# Patient Record
Sex: MALE | NOT HISPANIC OR LATINO | Employment: FULL TIME | URBAN - METROPOLITAN AREA
[De-identification: names, ages, dates, MRNs, and addresses within clinical notes are randomized per-mention and may not be internally consistent; named-entity substitution may affect disease eponyms.]

---

## 2019-08-06 ENCOUNTER — OFFICE VISIT (OUTPATIENT)
Dept: URGENT CARE | Facility: CLINIC | Age: 29
End: 2019-08-06

## 2019-08-06 VITALS
HEIGHT: 65 IN | HEART RATE: 116 BPM | TEMPERATURE: 98.3 F | OXYGEN SATURATION: 96 % | SYSTOLIC BLOOD PRESSURE: 116 MMHG | BODY MASS INDEX: 27.52 KG/M2 | RESPIRATION RATE: 12 BRPM | DIASTOLIC BLOOD PRESSURE: 72 MMHG | WEIGHT: 165.2 LBS

## 2019-08-06 DIAGNOSIS — L30.9 DERMATITIS: ICD-10-CM

## 2019-08-06 PROCEDURE — 99203 OFFICE O/P NEW LOW 30 MIN: CPT | Performed by: PHYSICIAN ASSISTANT

## 2019-08-06 RX ORDER — TRIAMCINOLONE ACETONIDE 1 MG/G
CREAM TOPICAL
Qty: 1 TUBE | Refills: 0 | Status: SHIPPED | OUTPATIENT
Start: 2019-08-06

## 2019-08-06 RX ORDER — METHYLPREDNISOLONE 4 MG/1
TABLET ORAL
Qty: 1 KIT | Refills: 0 | Status: SHIPPED | OUTPATIENT
Start: 2019-08-06

## 2019-08-06 SDOH — HEALTH STABILITY: MENTAL HEALTH: HOW OFTEN DO YOU HAVE A DRINK CONTAINING ALCOHOL?: 2-4 TIMES A MONTH

## 2019-08-06 ASSESSMENT — ENCOUNTER SYMPTOMS
COUGH: 0
SHORTNESS OF BREATH: 0
FEVER: 0
PALPITATIONS: 0

## 2019-08-06 NOTE — PROGRESS NOTES
"Subjective:      Roberth Gongora is a 29 y.o. male who presents with Rash (bilateral rib cage and foot x 3 days itchiness and getting worst )            Rash   This is a new problem. The current episode started in the past 7 days (after swimming in a river). The problem has been waxing and waning since onset. Location: torso and feet. The rash is characterized by redness and itchiness. It is unknown if there was an exposure to a precipitant. Pertinent negatives include no cough, fever or shortness of breath. Past treatments include nothing.       Review of Systems   Constitutional: Negative for fever and malaise/fatigue.   Respiratory: Negative for cough and shortness of breath.    Cardiovascular: Negative for chest pain and palpitations.   Skin: Positive for itching and rash.   All other systems reviewed and are negative.     PMH:  has no past medical history on file.  MEDS:   Current Outpatient Medications:   •  triamcinolone acetonide (KENALOG) 0.1 % Cream, Apply to affected area twice daily for 10 days, Disp: 1 Tube, Rfl: 0  •  methylPREDNISolone (MEDROL DOSEPAK) 4 MG Tablet Therapy Pack, Take as directed on package., Disp: 1 Kit, Rfl: 0  ALLERGIES: No Known Allergies  SURGHX: History reviewed. No pertinent surgical history.  SOCHX:  reports that he has never smoked. He has never used smokeless tobacco. He reports that he drinks alcohol. He reports that he does not use drugs.  FH: Family history was reviewed, no pertinent findings to report  Medications, Allergies, and current problem list reviewed today in Epic       Objective:     /72   Pulse (!) 116   Temp 36.8 °C (98.3 °F)   Resp 12   Ht 1.651 m (5' 5\")   Wt 74.9 kg (165 lb 3.2 oz)   SpO2 96%   BMI 27.49 kg/m²      Physical Exam   Constitutional: He appears well-developed and well-nourished.   Cardiovascular: Normal rate, regular rhythm and normal heart sounds.   Pulmonary/Chest: Effort normal and breath sounds normal.   Skin: Skin is warm and " dry. Rash noted. Rash is maculopapular.        Psychiatric: He has a normal mood and affect. His behavior is normal. Judgment and thought content normal.   Vitals reviewed.              Assessment/Plan:     1. Dermatitis    - triamcinolone acetonide (KENALOG) 0.1 % Cream; Apply to affected area twice daily for 10 days  Dispense: 1 Tube; Refill: 0  - methylPREDNISolone (MEDROL DOSEPAK) 4 MG Tablet Therapy Pack; Take as directed on package.  Dispense: 1 Kit; Refill: 0    Differential diagnosis, natural history, supportive care discussed. Follow-up with primary care provider within 7-10 days, emergency room precautions discussed.  Patient and/or family appears understanding of information.  Handout and review of patients diagnosis and treatment was discussed extensively.

## 2023-04-19 ENCOUNTER — OFFICE VISIT (OUTPATIENT)
Dept: URGENT CARE | Facility: CLINIC | Age: 33
End: 2023-04-19

## 2023-04-19 VITALS
SYSTOLIC BLOOD PRESSURE: 114 MMHG | HEART RATE: 96 BPM | RESPIRATION RATE: 14 BRPM | HEIGHT: 66 IN | BODY MASS INDEX: 26.68 KG/M2 | WEIGHT: 166 LBS | TEMPERATURE: 97.9 F | DIASTOLIC BLOOD PRESSURE: 72 MMHG | OXYGEN SATURATION: 98 %

## 2023-04-19 DIAGNOSIS — M20.012 MALLET DEFORMITY OF LEFT RING FINGER: ICD-10-CM

## 2023-04-19 PROCEDURE — 99203 OFFICE O/P NEW LOW 30 MIN: CPT

## 2023-04-19 ASSESSMENT — ENCOUNTER SYMPTOMS
FALLS: 0
FEVER: 0
SHORTNESS OF BREATH: 0
COUGH: 0
SENSORY CHANGE: 0

## 2023-04-19 NOTE — PROGRESS NOTES
"Subjective:     CHIEF COMPLAINT  Chief Complaint   Patient presents with    Finger Injury     X yesterday on L ring finger after \"banging\" finger while moving boxes.        HPI  Roberth Gongora is a very pleasant 32 y.o. male who presents with a left ring finger deformity that started yesterday.  Patient was lifting boxes at work all day, and in the evening he noticed that the distal joint of his left ring finger was bent downwards.  He denies any injury or pain.  He reports that is slightly painful along the joint, but not extremely bothersome and minimally noticeable.  He reports this happened 1 other time in the past on his right ring finger.  He otherwise feels well.    REVIEW OF SYSTEMS  Review of Systems   Constitutional:  Negative for fever and malaise/fatigue.   Respiratory:  Negative for cough and shortness of breath.    Cardiovascular:  Negative for chest pain.   Musculoskeletal:  Negative for falls.   Neurological:  Negative for sensory change.     PAST MEDICAL HISTORY  There are no problems to display for this patient.      SURGICAL HISTORY  patient denies any surgical history    ALLERGIES  No Known Allergies    CURRENT MEDICATIONS  Home Medications       Reviewed by Rosana Hutchison P.A.-C. (Physician Assistant) on 04/19/23 at 0853  Med List Status: <None>     Medication Last Dose Status   methylPREDNISolone (MEDROL DOSEPAK) 4 MG Tablet Therapy Pack Not Taking Active   triamcinolone acetonide (KENALOG) 0.1 % Cream Not Taking Active                    SOCIAL HISTORY  Social History     Tobacco Use    Smoking status: Never    Smokeless tobacco: Never   Vaping Use    Vaping Use: Never used   Substance and Sexual Activity    Alcohol use: Yes    Drug use: Never    Sexual activity: Not on file       FAMILY HISTORY  History reviewed. No pertinent family history.       Objective:     VITAL SIGNS: /72   Pulse 96   Temp 36.6 °C (97.9 °F) (Temporal)   Resp 14   Ht 1.676 m (5' 6\")   Wt 75.3 kg (166 lb) "   SpO2 98%   BMI 26.79 kg/m²     PHYSICAL EXAM  Physical Exam  Constitutional:       General: He is not in acute distress.     Appearance: Normal appearance. He is normal weight. He is not ill-appearing or toxic-appearing.   HENT:      Head: Normocephalic and atraumatic.      Mouth/Throat:      Mouth: Mucous membranes are moist.   Eyes:      Conjunctiva/sclera: Conjunctivae normal.   Cardiovascular:      Rate and Rhythm: Normal rate and regular rhythm.      Heart sounds: Normal heart sounds.   Pulmonary:      Effort: Pulmonary effort is normal. No respiratory distress.      Breath sounds: Normal breath sounds. No wheezing, rhonchi or rales.   Musculoskeletal:      Left hand: Deformity present. No tenderness or bony tenderness. Normal capillary refill.      Comments: L ring finger with DIP bent forwards. Patient is unable to fully extend distal portion of ring finger. No tenderness to palpation. Mild erythema to dorsal region of finger. Sensation fully intact. Radial and ulnar pulses 2+. No bony tenderness.    Skin:     General: Skin is warm and dry.      Capillary Refill: Capillary refill takes less than 2 seconds.   Neurological:      General: No focal deficit present.      Mental Status: He is alert and oriented to person, place, and time.   Psychiatric:         Mood and Affect: Mood normal.       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 Discussed supportive care with finger bracing and tylenol/ibuprofen as needed. Patient demonstrated understanding of treatment plan. Patient encouraged to follow up with PCP to monitor healing.     Differential diagnosis, natural history, supportive care, and indications for immediate  follow-up discussed. All questions answered. Patient agrees with the plan of care.    Follow-up as needed if symptoms worsen or fail to improve to PCP, Urgent care or Emergency Room.    I have personally reviewed prior external notes and test results pertinent to today's visit.  I have independently reviewed and interpreted all diagnostics ordered during this urgent care acute visit.   Discussed management options (risks,benefits, and alternatives to treatment). Pt expresses understanding and the treatment plan was agreed upon. Questions were encouraged and answered to pt's satisfaction.    Please note that this dictation was created using voice recognition software. I have made a reasonable attempt to correct obvious errors, but I expect that there are errors of grammar and possibly content that I did not discover before finalizing the note.